# Patient Record
Sex: FEMALE | Race: BLACK OR AFRICAN AMERICAN | Employment: PART TIME
[De-identification: names, ages, dates, MRNs, and addresses within clinical notes are randomized per-mention and may not be internally consistent; named-entity substitution may affect disease eponyms.]

---

## 2024-04-25 ENCOUNTER — HOSPITAL ENCOUNTER (EMERGENCY)
Facility: HOSPITAL | Age: 39
Discharge: HOME | End: 2024-04-25
Attending: EMERGENCY MEDICINE

## 2024-04-25 VITALS
WEIGHT: 140 LBS | BODY MASS INDEX: 21.97 KG/M2 | SYSTOLIC BLOOD PRESSURE: 131 MMHG | DIASTOLIC BLOOD PRESSURE: 67 MMHG | TEMPERATURE: 98.2 F | HEART RATE: 79 BPM | RESPIRATION RATE: 16 BRPM | OXYGEN SATURATION: 100 % | HEIGHT: 67 IN

## 2024-04-25 DIAGNOSIS — J40 BRONCHITIS: Primary | ICD-10-CM

## 2024-04-25 LAB
FLUAV AG NPH QL IA: NEGATIVE
FLUBV AG NOSE QL IA: NEGATIVE
SARS-COV-2 RDRP RESP QL NAA+PROBE: NOT DETECTED
SOURCE: NORMAL

## 2024-04-25 PROCEDURE — 87804 INFLUENZA ASSAY W/OPTIC: CPT

## 2024-04-25 PROCEDURE — 6370000000 HC RX 637 (ALT 250 FOR IP): Performed by: EMERGENCY MEDICINE

## 2024-04-25 PROCEDURE — 87635 SARS-COV-2 COVID-19 AMP PRB: CPT

## 2024-04-25 PROCEDURE — 99283 EMERGENCY DEPT VISIT LOW MDM: CPT

## 2024-04-25 RX ORDER — AMOXICILLIN 250 MG/1
500 CAPSULE ORAL
Status: COMPLETED | OUTPATIENT
Start: 2024-04-25 | End: 2024-04-25

## 2024-04-25 RX ORDER — IBUPROFEN 600 MG/1
600 TABLET ORAL EVERY 6 HOURS PRN
Status: DISCONTINUED | OUTPATIENT
Start: 2024-04-25 | End: 2024-04-25 | Stop reason: HOSPADM

## 2024-04-25 RX ORDER — IBUPROFEN 600 MG/1
600 TABLET ORAL 3 TIMES DAILY PRN
Qty: 30 TABLET | Refills: 0 | Status: SHIPPED | OUTPATIENT
Start: 2024-04-25

## 2024-04-25 RX ORDER — AMOXICILLIN 500 MG/1
500 CAPSULE ORAL 3 TIMES DAILY
Qty: 30 CAPSULE | Refills: 0 | Status: SHIPPED | OUTPATIENT
Start: 2024-04-25 | End: 2024-05-05

## 2024-04-25 RX ADMIN — AMOXICILLIN 500 MG: 250 CAPSULE ORAL at 20:31

## 2024-04-25 RX ADMIN — IBUPROFEN 600 MG: 600 TABLET, FILM COATED ORAL at 20:31

## 2024-04-25 ASSESSMENT — PAIN SCALES - GENERAL: PAINLEVEL_OUTOF10: 3

## 2024-04-25 ASSESSMENT — LIFESTYLE VARIABLES: HOW OFTEN DO YOU HAVE A DRINK CONTAINING ALCOHOL: NEVER

## 2024-04-25 ASSESSMENT — PAIN DESCRIPTION - LOCATION: LOCATION: GENERALIZED

## 2024-04-25 ASSESSMENT — ENCOUNTER SYMPTOMS
COUGH: 1
SORE THROAT: 0
ABDOMINAL PAIN: 0
WHEEZING: 0

## 2024-04-25 ASSESSMENT — PAIN - FUNCTIONAL ASSESSMENT: PAIN_FUNCTIONAL_ASSESSMENT: 0-10

## 2024-04-25 NOTE — ED TRIAGE NOTES
Pt to ED for eval of cough, congestion, fatigue, body aches x 5 days. No relief from OTC remedies.

## 2024-04-25 NOTE — ED PROVIDER NOTES
mis-transcribed.)    Ed Nunez MD(electronically signed)  Attending Emergency Physician          Ed Nunez MD  04/26/24 [id number]